# Patient Record
Sex: MALE | URBAN - METROPOLITAN AREA
[De-identification: names, ages, dates, MRNs, and addresses within clinical notes are randomized per-mention and may not be internally consistent; named-entity substitution may affect disease eponyms.]

---

## 2018-02-28 NOTE — PATIENT DISCUSSION
New Prescription: neomycin-polymyxin-dexameth (neomycin-polymyxin-dexameth): ointment: 3.5-10,000-0.1 mg-unit/g-% a small amount twice a day as directed on eyelid 02-

## 2018-02-28 NOTE — PATIENT DISCUSSION
MILD DRY EYE, OU: PRESCRIBED REFRESH OPTIVE ARTIFICIAL TEARS PRN OU. RECOMMENDS OMEGA-3 FISH OIL WITH PRIMARY CARE PHYSICIANS APPROVAL. RETURN FOR FOLLOW-UP AS SCHEDULED OR SOONER IF SYMPTOMS WORSEN.

## 2018-02-28 NOTE — PATIENT DISCUSSION
IVAN/SANTHOSH  LLL  : PRESCRIBED WARM COMPRESSES, EYELID SCRUBS AND MAXITROL OINTMENT BID X 7 DAYS LLL.

## 2018-02-28 NOTE — PATIENT DISCUSSION
Stye/Hordeolum Counseling:  I explained to the patient that a stye is an inflammatory reaction to trapped oil secretions in the eyelid. I also explained that while styes usually respond well to treatment, some people are prone to recurrences of them. The patient was instructed on the use of warm compresses on the stye for five to ten minutes, three to four times per day. Return for follow-up as scheduled or sooner if symptoms worsen.

## 2018-02-28 NOTE — PATIENT DISCUSSION
ALLERGIC CONJUNCTIVITIS OU: PRESCRIBE PAZEO TO USE QAM PRN FOR ITCHING AND IRRITATION. CALL IF NO IMPROVEMENT IN SYMPTOMS.

## 2018-02-28 NOTE — PATIENT DISCUSSION
*CATARACT, OS- BECOMING VISUALLY SIGNIFICANT BUT NOT BOTHERSOME TO PATIENT AT THIS TIME. SPEC RX OFFERED. FOLLOW AS SCHEDULED.

## 2018-08-27 NOTE — PATIENT DISCUSSION
New Prescription: Maxitrol (neomycin-polymyxin-dexameth): ointment: 3.5-10,000-0.1 mg-unit/g-% 1 a small amount twice a day as directed into left eye 08-

## 2018-08-27 NOTE — PATIENT DISCUSSION
HORDEOLUM, OS: PRESCRIBED WARM COMPRESSES 10 MINUTES QID, MAXITROL OINTMENT BID APPLIED WITH LID MASSAGE FOLLOWING WARM COMPRESSES, AND ADVISED TO FINISH ORAL DOXY AS PRESCRIBED. PATIENT ADVISED TO RETURN TO CLINIC FOR FOLLOW UP IN 1 MONTH WITH DR ENRIQUEZ IF THERE IS NOT FULL RESOLUTION, OR SOONER IF SYMPTOMS WORSEN.

## 2019-02-06 NOTE — PATIENT DISCUSSION
DERMATOCHALASIS / PTOSIS RUL AND MOO :  VISUALLY SIGNIFICANT TO PATIENT. SCHEDULE WITH OCULOPLASTIC SPECIALIST IF PATIENT DESIRES.

## 2019-04-08 ENCOUNTER — IMPORTED ENCOUNTER (OUTPATIENT)
Dept: URBAN - METROPOLITAN AREA CLINIC 1 | Facility: CLINIC | Age: 34
End: 2019-04-08

## 2019-04-08 PROBLEM — H52.13: Noted: 2019-04-08

## 2019-04-08 PROCEDURE — S0620 ROUTINE OPHTHALMOLOGICAL EXA: HCPCS

## 2019-04-08 NOTE — PATIENT DISCUSSION
1. Myopia: Rx was given for correction if indicated and requested. 2. Return for an appointment in 1 year for 40. with Dr. Sujata Vasquez.

## 2019-12-12 ENCOUNTER — HOSPITAL ENCOUNTER (OUTPATIENT)
Dept: CT IMAGING | Age: 34
Discharge: HOME | End: 2019-12-12
Attending: OTOLARYNGOLOGY
Payer: COMMERCIAL

## 2019-12-12 DIAGNOSIS — J32.0 CHRONIC MAXILLARY SINUSITIS: ICD-10-CM

## 2019-12-12 PROCEDURE — 70486 CT MAXILLOFACIAL W/O DYE: CPT

## 2020-07-15 NOTE — PATIENT DISCUSSION
CATARACT, OS- VISUALLY SIGNIFICANT. SCHEDULE PHACO WITH IOL OS  AND IOL CALCULATION. GLASSES RX GIVEN TO FILL IF DESIRES IN THE EVENT PATIENT DOES NOT PROCEED WITH SURGERY.

## 2022-04-02 ASSESSMENT — KERATOMETRY
OD_K1POWER_DIOPTERS: 44.25
OS_K2POWER_DIOPTERS: 45.75
OS_K1POWER_DIOPTERS: 45.00
OD_AXISANGLE_DEGREES: 137
OS_AXISANGLE_DEGREES: 170
OD_K2POWER_DIOPTERS: 44.50
OS_AXISANGLE2_DEGREES: 080
OD_AXISANGLE2_DEGREES: 047

## 2022-04-02 ASSESSMENT — TONOMETRY
OD_IOP_MMHG: 15
OS_IOP_MMHG: 15

## 2022-04-02 ASSESSMENT — VISUAL ACUITY
OD_CC: 20/40
OS_CC: 20/40-2